# Patient Record
Sex: MALE | URBAN - METROPOLITAN AREA
[De-identification: names, ages, dates, MRNs, and addresses within clinical notes are randomized per-mention and may not be internally consistent; named-entity substitution may affect disease eponyms.]

---

## 2021-05-17 ENCOUNTER — OFFICE VISIT (OUTPATIENT)
Dept: URBAN - METROPOLITAN AREA SURGERY CENTER 23 | Facility: SURGERY CENTER | Age: 66
End: 2021-05-17
Payer: MEDICARE

## 2021-05-17 DIAGNOSIS — Z12.11 COLON CANCER SCREENING: ICD-10-CM

## 2021-05-17 PROCEDURE — G8907 PT DOC NO EVENTS ON DISCHARG: HCPCS | Performed by: INTERNAL MEDICINE

## 2021-05-17 PROCEDURE — G0121 COLON CA SCRN NOT HI RSK IND: HCPCS | Performed by: INTERNAL MEDICINE

## 2024-02-16 ENCOUNTER — OV EP (OUTPATIENT)
Dept: URBAN - METROPOLITAN AREA CLINIC 88 | Facility: CLINIC | Age: 69
End: 2024-02-16
Payer: MEDICARE

## 2024-02-16 VITALS
OXYGEN SATURATION: 100 % | HEART RATE: 75 BPM | WEIGHT: 117 LBS | SYSTOLIC BLOOD PRESSURE: 128 MMHG | TEMPERATURE: 97.2 F | DIASTOLIC BLOOD PRESSURE: 84 MMHG | BODY MASS INDEX: 19.97 KG/M2 | HEIGHT: 64 IN

## 2024-02-16 DIAGNOSIS — K92.1 HEMATOCHEZIA: ICD-10-CM

## 2024-02-16 PROCEDURE — 99203 OFFICE O/P NEW LOW 30 MIN: CPT | Performed by: NURSE PRACTITIONER

## 2024-02-16 RX ORDER — LISINOPRIL AND HYDROCHLOROTHIAZIDE 10; 12.5 MG/1; MG/1
1 TABLET TABLET ORAL ONCE A DAY
Status: ACTIVE | COMMUNITY

## 2024-02-16 RX ORDER — ATORVASTATIN CALCIUM 20 MG/1
1 TABLET TABLET, FILM COATED ORAL ONCE A DAY
Status: ACTIVE | COMMUNITY

## 2024-02-16 NOTE — PHYSICAL EXAM RECTAL:
Chaperone present.  No external lesions or hemorrhoids, normal rectal tone, no pain voiced with SREEKANTH, no internal lesion or rectal bleeding.

## 2024-02-16 NOTE — HPI-TODAY'S VISIT:
68 y.o. presenting with complaints of rectal bleeding that started a week ago.  Observed BRRB with defecation in toilet x 3 episodes during this time.  Denies rectal pain, constipation or abdominal pain associated with bleeding.  Symptoms resolved within a year.    Denies observing signs of rectal bleeding with last 3 BMs.  Typically, defecation occurs once every day to every other day.  Voices a complete sense of evacuation of stool voiced.  Last colonoscopy 2021 and sigmoid diverticulosis.  Repeat colonoscopy recommended in 10 years.

## 2024-03-08 ENCOUNTER — OV EP (OUTPATIENT)
Dept: URBAN - METROPOLITAN AREA CLINIC 88 | Facility: CLINIC | Age: 69
End: 2024-03-08